# Patient Record
Sex: MALE | Race: BLACK OR AFRICAN AMERICAN | Employment: OTHER | URBAN - METROPOLITAN AREA
[De-identification: names, ages, dates, MRNs, and addresses within clinical notes are randomized per-mention and may not be internally consistent; named-entity substitution may affect disease eponyms.]

---

## 2017-03-07 ENCOUNTER — TELEPHONE (OUTPATIENT)
Dept: UROLOGY | Age: 72
End: 2017-03-07

## 2017-05-11 ENCOUNTER — OFFICE VISIT (OUTPATIENT)
Dept: UROLOGY | Age: 72
End: 2017-05-11

## 2017-05-11 VITALS
HEIGHT: 76 IN | WEIGHT: 252 LBS | OXYGEN SATURATION: 96 % | SYSTOLIC BLOOD PRESSURE: 157 MMHG | DIASTOLIC BLOOD PRESSURE: 79 MMHG | BODY MASS INDEX: 30.69 KG/M2 | HEART RATE: 68 BPM

## 2017-05-11 DIAGNOSIS — C61 PROSTATE CANCER (HCC): Primary | ICD-10-CM

## 2017-05-11 LAB
BILIRUB UR QL STRIP: NEGATIVE
GLUCOSE UR-MCNC: NEGATIVE MG/DL
KETONES P FAST UR STRIP-MCNC: NEGATIVE MG/DL
PH UR STRIP: 7.5 [PH] (ref 4.6–8)
PROT UR QL STRIP: NORMAL MG/DL
SP GR UR STRIP: 1.02 (ref 1–1.03)
UA UROBILINOGEN AMB POC: NORMAL (ref 0.2–1)
URINALYSIS CLARITY POC: CLEAR
URINALYSIS COLOR POC: YELLOW
URINE BLOOD POC: NEGATIVE
URINE LEUKOCYTES POC: NEGATIVE
URINE NITRITES POC: NEGATIVE

## 2017-05-11 NOTE — PROGRESS NOTES
Mr. Herman Case has a reminder for a \"due or due soon\" health maintenance. I have asked that he contact his primary care provider for follow-up on this health maintenance.

## 2017-05-11 NOTE — PROGRESS NOTES
Robertcali Dalton    Chief Complaint   Patient presents with    Prostate Cancer       History and Physical    The patient is a 40-year-old -American male with a diagnosis of high-grade prostate cancer metastatic to his bones, involving both axial and peripheral skeleton. His pretreatment PSA was listed as being greater than 100 and the laboratory did not perform dilution studies. The patient was begun on Casodex and August and within a short period of time a PSA was done and was down to 70. After a period of time of Casodex induction, the patient received a dose of Lupron 6 month Depo-Lupron. The patient did not return for his 6 month repeat injection. However, at that time a PSA had indicated a drop down to the level of 2.42. The patient comes in now doing quite well with stable health weight strength and appetite. He is having no bone issues. He has continued to take his Casodex    Past Medical History:   Diagnosis Date    Elevated PSA     Hypertension     Personal history of prostate cancer      There is no problem list on file for this patient. History reviewed. No pertinent surgical history. Current Outpatient Prescriptions   Medication Sig Dispense Refill    FOLIC ACID/MULTIVIT-MIN/LUTEIN (CENTRUM SILVER PO) Take by Mouth.  amLODIPine-benazepril (LOTREL) 5-10 mg per capsule   0    aspirin delayed-release 81 mg tablet Take  by mouth daily.  bicalutamide (CASODEX) 50 mg tablet Take 1 Tab by mouth daily. 30 Tab 11    leuprolide (LUPRON DEPOT, 6 MONTH,) 45 mg injection 45 mg by IntraMUSCular route once. No Known Allergies  Social History     Social History    Marital status: SINGLE     Spouse name: N/A    Number of children: N/A    Years of education: N/A     Occupational History    Not on file.      Social History Main Topics    Smoking status: Never Smoker    Smokeless tobacco: Never Used    Alcohol use No    Drug use: No    Sexual activity: No     Other Topics Concern    Not on file     Social History Narrative      Family History   Problem Relation Age of Onset    Hypertension Mother     Diabetes Mother     Stroke Sister            Visit Vitals    /79 (BP 1 Location: Right arm, BP Patient Position: Sitting)    Pulse 68    Ht 6' 4\" (1.93 m)    Wt 252 lb (114.3 kg)    SpO2 96%    BMI 30.67 kg/m2     Physical        Gen: WDWN adult NAD  Head  : normocephalic,  Normal ROM; eyes without normal pupils, EOMs, no masses;  conjunctiva normal  Neck: normal movement,  no evident mass,  No evident adenopathy, trachea midline,    Abd :bowel sounds normal, no masses, tenderness, organomegaly  Flanks     -    Extremities- no edema, arthritis, deformity, swelling  Psych- oriented, no evident anxiety, no cognitive impairment evident    Urine is negative  PSA is drawn                  Impression/ PLAN  High-grade stage T4 prostate cancer where I have attempted total androgen blockade but the patient has essentially been on Casodex monotherapy. Plan: This was another extended discussion covering the reasons behind the combination therapy. However, as compliance might be an issue, I am going to check a PSA today and assess how he is doing with prostate cancer suppression on Casodex monotherapy. If the PSA has rebounded, then we need to have him come back and restart Lupron and we can hopefully keep that going on an every six-month basis. Failing that, the patient would need to return to oncology. This visit exceeded 25 minutes and >50% was counselling  The patient understands the discussion and plan    PLEASE NOTE:      This document has been produced using voice recognition software.   Unrecognized errors in transcription may be present    Melissa Mercer MD

## 2017-05-11 NOTE — PATIENT INSTRUCTIONS
Prostate-Specific Antigen (PSA) Test: About This Test  What is it? A prostate-specific antigen (PSA) test measures the amount of PSA in your blood. PSA is released by a man's prostate gland into his blood. A high PSA level may mean that you have an enlargement, infection, or cancer of the prostate. Why is this test done? You may have this test to:  · Check for prostate cancer. · Watch prostate cancer and see if treatment is working. How can you prepare for the test?  Do not ejaculate during the 2 days before your PSA blood test, either during sex or masturbation. What happens before the test?  Tell your doctor if you have had a:  · Test to look at your bladder (cystoscopy) in the past several weeks. · Prostate biopsy in the past several weeks. · Prostate infection or urinary tract infection that has not gone away. · Tube (catheter) inserted into your bladder to drain urine recently. What happens during the test?  A health professional takes a sample of your blood. What happens after the test?  You can go back to your usual activities right away. When should you call for help? Watch closely for changes in your health, and be sure to contact your doctor if you have any questions about this test.  Follow-up care is a key part of your treatment and safety. Be sure to make and go to all appointments, and call your doctor if you are having problems. It's also a good idea to keep a list of the medicines you take. Ask your doctor when you can expect to have your test results. Where can you learn more? Go to http://natalia-oneyda.info/. Enter L898 in the search box to learn more about \"Prostate-Specific Antigen (PSA) Test: About This Test.\"  Current as of: July 26, 2016  Content Version: 11.2  © 0145-8164 [employer]. Care instructions adapted under license by [employer] (which disclaims liability or warranty for this information).  If you have questions about a medical condition or this instruction, always ask your healthcare professional. [place] [address] any warranty or liability for your use of this information.

## 2017-05-12 LAB — PSA SERPL-MCNC: 3.3 NG/ML (ref 0–4)

## 2017-05-13 NOTE — PROGRESS NOTES
Alcira Connors, please see that copy of recent psa is mailed to this patient.   I called and left him a message that we could stay with casodex by itself or add lupron

## 2018-02-20 ENCOUNTER — OFFICE VISIT (OUTPATIENT)
Dept: UROLOGY | Age: 73
End: 2018-02-20

## 2018-02-20 VITALS
SYSTOLIC BLOOD PRESSURE: 149 MMHG | DIASTOLIC BLOOD PRESSURE: 85 MMHG | HEIGHT: 76 IN | BODY MASS INDEX: 30.44 KG/M2 | HEART RATE: 66 BPM | WEIGHT: 250 LBS | OXYGEN SATURATION: 95 %

## 2018-02-20 DIAGNOSIS — M85.89 OTHER SPECIFIED DISORDERS OF BONE DENSITY AND STRUCTURE, MULTIPLE SITES: ICD-10-CM

## 2018-02-20 DIAGNOSIS — C61 PROSTATE CANCER (HCC): Primary | ICD-10-CM

## 2018-02-20 LAB
BILIRUB UR QL STRIP: NEGATIVE
GLUCOSE UR-MCNC: NEGATIVE MG/DL
KETONES P FAST UR STRIP-MCNC: NEGATIVE MG/DL
PH UR STRIP: 7.5 [PH] (ref 4.6–8)
PROT UR QL STRIP: NEGATIVE
SP GR UR STRIP: 1.01 (ref 1–1.03)
UA UROBILINOGEN AMB POC: NORMAL (ref 0.2–1)
URINALYSIS CLARITY POC: CLEAR
URINALYSIS COLOR POC: YELLOW
URINE BLOOD POC: NEGATIVE
URINE LEUKOCYTES POC: NEGATIVE
URINE NITRITES POC: NEGATIVE

## 2018-02-20 RX ORDER — BICALUTAMIDE 50 MG/1
50 TABLET, FILM COATED ORAL DAILY
Qty: 90 TAB | Refills: 3 | Status: SHIPPED | OUTPATIENT
Start: 2018-02-20 | End: 2018-04-26

## 2018-02-20 NOTE — PATIENT INSTRUCTIONS
Prostate-Specific Antigen (PSA) Test: About This Test  What is it? A prostate-specific antigen (PSA) test measures the amount of PSA in your blood. PSA is released by a man's prostate gland into his blood. A high PSA level may mean that you have an enlargement, infection, or cancer of the prostate. Why is this test done? You may have this test to:  · Check for prostate cancer. · Watch prostate cancer and see if treatment is working. How can you prepare for the test?  Do not ejaculate during the 2 days before your PSA blood test, either during sex or masturbation. What happens before the test?  Tell your doctor if you have had a:  · Test to look at your bladder (cystoscopy) in the past several weeks. · Prostate biopsy in the past several weeks. · Prostate infection or urinary tract infection that has not gone away. · Tube (catheter) inserted into your bladder to drain urine recently. What happens during the test?  A health professional takes a sample of your blood. What happens after the test?  You can go back to your usual activities right away. When should you call for help? Watch closely for changes in your health, and be sure to contact your doctor if you have any questions about this test.  Follow-up care is a key part of your treatment and safety. Be sure to make and go to all appointments, and call your doctor if you are having problems. It's also a good idea to keep a list of the medicines you take. Ask your doctor when you can expect to have your test results. Where can you learn more? Go to http://natalia-oneyda.info/. Enter N084 in the search box to learn more about \"Prostate-Specific Antigen (PSA) Test: About This Test.\"  Current as of: May 12, 2017  Content Version: 11.4  © 3591-7510 [employer]. Care instructions adapted under license by [employer] (which disclaims liability or warranty for this information).  If you have questions about a medical condition or this instruction, always ask your healthcare professional. [place] [address] any warranty or liability for your use of this information.

## 2018-02-20 NOTE — PROGRESS NOTES
RBV. Per Dr. Orion Treviño lab drawn in office today for PSA for prostate cancer. Mr. Becca Siegel has a reminder for a \"due or due soon\" health maintenance. I have asked that he contact his primary care provider for follow-up on this health maintenance.

## 2018-02-20 NOTE — MR AVS SNAPSHOT
[address] [facility] [place] [address] [address] 
[phone] Patient: Komal Julio MRN: [id number] QNW:[id number] Visit Information Date & Time Provider Department Dept. Phone Encounter #  
 2/20/2018  1:15 PM Harish Thomas, [facility] [phone] [id number] Follow-up Instructions Return in about 1 week (around 2/27/2018) for after bone density studies. Follow-up and Disposition History Your Appointments 3/20/2018  1:15 PM  
Office Visit with Harish Thomas MD  
[facility]) Appt Note: check up after bone density test  
 [address] [place] [address] [address]  
[phone] [address] [facility] [address] Upcoming Health Maintenance Date Due DTaP/Tdap/Td series (1 - Tdap) 9/13/1964 FOBT Q 1 YEAR AGE 50-75 9/13/1993 ZOSTER VACCINE AGE 60> 7/13/2003 GLAUCOMA SCREENING Q2Y 9/13/2008 Pneumococcal 65+ High/Highest Risk (1 of 2 - PCV13) 9/13/2008 MEDICARE YEARLY EXAM 9/13/2008 Influenza Age 5 to Adult 8/1/2017 Allergies as of 2/20/2018  Review Complete On: 2/20/2018 By: Andrey Conrad LPN No Known Allergies Current Immunizations  Never Reviewed No immunizations on file. Not reviewed this visit You Were Diagnosed With   
  
 Codes Comments Prostate cancer [facility])    -  Primary ICD-10-CM: F00 ICD-9-CM: 579 Other specified disorders of bone density and structure, multiple sites     ICD-10-CM: M85.89 ICD-9-CM: 733.99 Vitals BP Pulse Height(growth percentile) Weight(growth percentile) SpO2 BMI  
 149/85 (BP 1 Location: Left arm, BP Patient Position: Sitting) 66 6' 4\" (1.93 m) 250 lb (113.4 kg) 95% 30.43 kg/m2 Smoking Status Never Smoker Vitals History BMI and BSA Data Body Mass Index Body Surface Area 30.43 kg/m 2 2.47 m 2 Preferred Pharmacy Pharmacy Name Phone [address] [phone] Your Updated Medication List  
  
   
This list is accurate as of: 2/20/18  3:29 PM.  Always use your most recent med list. amLODIPine-benazepril 5-10 mg per capsule Commonly known as:  LOTREL  
  
 aspirin delayed-release 81 mg tablet Take  by mouth daily. * bicalutamide 50 mg tablet Commonly known as:  CASODEX Take 1 Tab by mouth daily. * bicalutamide 50 mg tablet Commonly known as:  CASODEX Take 1 Tab by mouth daily. CENTRUM SILVER PO Take by Mouth. LUPRON DEPOT (6 MONTH) 45 mg injection Generic drug:  leuprolide depot 45 mg by IntraMUSCular route once. * Notice: This list has 2 medication(s) that are the same as other medications prescribed for you. Read the directions carefully, and ask your doctor or other care provider to review them with you. Prescriptions Printed Refills  
 bicalutamide (CASODEX) 50 mg tablet 3 Sig: Take 1 Tab by mouth daily. Class: Print Route: Oral  
  
We Performed the Following AMB POC URINALYSIS DIP STICK AUTO W/O MICRO [24921 CPT(R)] COLLECTION VENOUS BLOOD,VENIPUNCTURE [id number] CPT(R)] PSA, DIAGNOSTIC (PROSTATE SPECIFIC AG) [id number] CPT(R)] Follow-up Instructions Return in about 1 week (around 2/27/2018) for after bone density studies. To-Do List   
 02/27/2018 Imaging:  DEXA BONE DENSITY STUDY AXIAL Patient Instructions Prostate-Specific Antigen (PSA) Test: About This Test 
What is it? A prostate-specific antigen (PSA) test measures the amount of PSA in your blood. PSA is released by a man's prostate gland into his blood. A high PSA level may mean that you have an enlargement, infection, or cancer of the prostate. Why is this test done? You may have this test to: · Check for prostate cancer. · Watch prostate cancer and see if treatment is working. How can you prepare for the test? 
Do not ejaculate during the 2 days before your PSA blood test, either during sex or masturbation. What happens before the test? 
Tell your doctor if you have had a: 
· Test to look at your bladder (cystoscopy) in the past several weeks. · Prostate biopsy in the past several weeks. · Prostate infection or urinary tract infection that has not gone away. · Tube (catheter) inserted into your bladder to drain urine recently. What happens during the test? 
A health professional takes a sample of your blood. What happens after the test? 
You can go back to your usual activities right away. When should you call for help? Watch closely for changes in your health, and be sure to contact your doctor if you have any questions about this test. 
Follow-up care is a key part of your treatment and safety. Be sure to make and go to all appointments, and call your doctor if you are having problems. It's also a good idea to keep a list of the medicines you take. Ask your doctor when you can expect to have your test results. Where can you learn more? Go to http://natalia-oneyda.info/. Enter B449 in the search box to learn more about \"Prostate-Specific Antigen (PSA) Test: About This Test.\" Current as of: May 12, 2017 Content Version: 11.4 © 3307-2640 [employer]. Care instructions adapted under license by [employer] (which disclaims liability or warranty for this information). If you have questions about a medical condition or this instruction, always ask your healthcare professional. [place] [address] any warranty or liability for your use of this information. Patient Instructions History Introducing [facility]!    
 Aylin Freeman introduces Chunnel.[employer] patient portal. Now you can access parts of your medical record, email your doctor's office, and request medication refills online. 1. In your internet browser, go to [email or web address] 2. Click on the First Time User? Click Here link in the Sign In box. You will see the New Member Sign Up page. 3. Enter your Plethora Access Code exactly as it appears below. You will not need to use this code after youve completed the sign-up process. If you do not sign up before the expiration date, you must request a new code. · Plethora Access Code: [email or web address] Expires: 5/21/2018  3:29 PM 
 
4. Enter the last four digits of your Social Security Number (xxxx) and Date of Birth (mm/dd/yyyy) as indicated and click Submit. You will be taken to the next sign-up page. 5. Create a Plethora ID. This will be your Plethora login ID and cannot be changed, so think of one that is secure and easy to remember. 6. Create a Plethora password. You can change your password at any time. 7. Enter your Password Reset Question and Answer. This can be used at a later time if you forget your password. 8. Enter your e-mail address. You will receive e-mail notification when new information is available in [address] 9. Click Sign Up. You can now view and download portions of your medical record. 10. Click the Download Summary menu link to download a portable copy of your medical information. If you have questions, please visit the Frequently Asked Questions section of the Plethora website. Remember, Plethora is NOT to be used for urgent needs. For medical emergencies, dial 911. Now available from your iPhone and Android! Please provide this summary of care documentation to your next provider. Your primary care clinician is listed as NONE. If you have any questions after today's visit, please call [phone].

## 2018-02-20 NOTE — PROGRESS NOTES
Patient is a 38-year-old -American male who has high-grade T4 prostate cancer with a PSA at presentation in excess of 100. The patient was temporarily on Lupron but refused further treatment and essentially has been on Casodex monotherapy. When I saw him in May his PSA was suppressed still at 3.3 and the patient comes now wanting to continue his Casodex. The patient has stable health weight strength and appetite. He denies any bone pain. His activity level is unchanged. Urine is negative  SA is drawn and I will call him with those results. In the meantime, I have again had an extended discussion with the patient regarding the need for bone density studies because the patient has been on hormone therapy on a rather irregular basis for several years. I have talked with the patient on several occasions about again becoming involved with oncology but the patient again wants to wait until today's PSA result is known      This visit exceeded 15 minutes and greater than 50% was counseling. The patient expresses understanding of the treatment plan and wishes to proceed    This dictation used voice recognition software and there may be mistakes.     Ernestina Courser MD

## 2018-02-21 LAB — PSA SERPL-MCNC: 163.1 NG/ML (ref 0–4)

## 2018-02-22 ENCOUNTER — CLINICAL SUPPORT (OUTPATIENT)
Dept: UROLOGY | Age: 73
End: 2018-02-22

## 2018-02-22 VITALS — HEIGHT: 76 IN

## 2018-02-22 DIAGNOSIS — C61 CANCER OF PROSTATE W/HIGH RECUR RISK (T3A OR GLEASON 8-10 OR PSA>20): Primary | ICD-10-CM

## 2018-02-22 DIAGNOSIS — R97.20 ELEVATED PSA: ICD-10-CM

## 2018-02-22 DIAGNOSIS — C61 PROSTATE CANCER (HCC): Primary | ICD-10-CM

## 2018-02-22 NOTE — PROGRESS NOTES
After the telephone conversation this morning during which I informed the patient that his PSA was up at 163, the patient comes now for a Lupron shot. The patient reassures me that he has continued to take his Casodex on a daily basis. The patient is asking whether or not dietary changes could have influenced the PSA and I have advised him now. Receives a 45 mg 6 month Lupron injection in the right upper outer gluteal quadrant he tolerated this well and we are agreed that the patient will return in 2 months for a PSA recheck. This visit exceeded 10 minutes and greater than 50% was counseling. The patient expresses understanding of the treatment plan and wishes to proceed    This dictation used voice recognition software and there may be mistakes.     Alannah Farris MD

## 2018-02-22 NOTE — PROGRESS NOTES
A voicemail message for the patient indicating that the PSA has gone up to 163 and this represents a failure of the Casodex to suppress the cancer and that at the very least we need to restart him on the Lupron and get a bone scan in addition to the bone density study that is already been scheduled.   I have asked the patient to please call the office back and that I will go ahead and get the bone scan scheduled

## 2018-02-22 NOTE — PATIENT INSTRUCTIONS
Leuprolide (By injection)   Leuprolide ([id number])  Treats endometriosis, uterine fibroids, and premature puberty. Also treats symptoms of prostate cancer. Brand Name(s): Eligard, Lupaneta Pack, Lupron Depot, Lupron Depot-Ped   There may be other brand names for this medicine. When This Medicine Should Not Be Used: This medicine is not right for everyone. You should not receive it if you had an allergic reaction to leuprolide or similar medicines, or if you are pregnant or breastfeeding. How to Use This Medicine:   Injectable  · Your doctor will prescribe your exact dose and schedule. This medicine is given as a shot into a muscle or under the skin. Leuprolide injection is given on different schedules for different conditions. It might be given every day, once a month, or every few months. · A nurse or other health provider will give you this medicine. · You may be taught how to give your medicine at home. Make sure you understand all instructions before giving yourself an injection. Do not use more medicine or use it more often than your doctor tells you to. · You will be shown the body areas where this shot can be given. Use a different body area each time you give yourself a shot. Keep track of where you give each shot to make sure you rotate body areas. · Use a new needle and syringe each time you inject your medicine. · This medicine should come with a Medication Guide. Ask your pharmacist for a copy if you do not have one. · Read and follow the patient instructions that come with this medicine. Talk to your doctor or pharmacist if you have any questions. · Missed dose: This medicine needs to be given on a fixed schedule. If you miss a dose, call your doctor, home health caregiver, or treatment clinic for instructions. · If you store this medicine at home, keep it at room temperature, away from heat, moisture, and direct light.   Drugs and Foods to Avoid:   Ask your doctor or pharmacist before using any other medicine, including over-the-counter medicines, vitamins, and herbal products. · Some medicines can affect how leuprolide works. Tell your doctor if you are using any of the following:  ¨ Medicine to treat depression (including bupropion)  ¨ Medicine to treat heart rhythm problems  ¨ Medicine to treat seizures  ¨ Steroid medicine (including dexamethasone, hydrocortisone, methylprednisolone, prednisolone, prednisone)  Warnings While Using This Medicine:   · It is not safe to take this medicine during pregnancy. It could harm an unborn baby. Tell your doctor right away if you become pregnant. Women who are using this medicine should use birth control that does not contain hormones. · Tell your doctor if you have kidney disease, heart failure, diabetes, heart or blood vessel disease, heart rhythm problems (including long QT syndrome), problems with your nervous system, or a history of brain tumor, depression, mental illness, seizures, or stroke. · Women: Your menstrual periods should stop, but you might have light bleeding or spotting. If you continue to have heavy bleeding or regular periods, call your doctor. · This medicine may cause the following problems:  ¨ Changes in mood or behavior  ¨ Increased risk for seizures  ¨ Heart rhythm changes  ¨ Weaker bones, which may lead to osteoporosis  ¨ Problems with the urinary tract or spinal cord (in men)  ¨ Changes in blood sugar levels (in men)  ¨ Higher risk of heart attack or stroke (in men)  · Your symptoms might get worse when you first start using this medicine, but they should get better as the medicine starts to work. If your condition does not begin to improve after 2 weeks, check with your doctor. · Tell any doctor or dentist who treats you that you are using this medicine. This medicine may affect certain medical test results. · Your doctor will check your progress and the effects of this medicine at regular visits.  Keep all appointments. · Keep all medicine out of the reach of children. Never share your medicine with anyone. Possible Side Effects While Using This Medicine:   Call your doctor right away if you notice any of these side effects:  · Allergic reaction: Itching or hives, swelling in your face or hands, swelling or tingling in your mouth or throat, chest tightness, trouble breathing  · Change in how much or how often you urinate  · Depression, mood or behavior changes  · Fast, pounding, or uneven heartbeat  · Heavy vaginal bleeding  · Seizures  · Unusual or severe bone or back pain  If you notice these less serious side effects, talk with your doctor:   · Headache  · Hot flashes and sweating, warmth or redness in your face, neck, arms, or upper chest  · Loss of interest in sex, sexual problems  · Pain, itching, burning, bruises, or swelling where the shot was given  If you notice other side effects that you think are caused by this medicine, tell your doctor. Call your doctor for medical advice about side effects. You may report side effects to FDA at 0-997-FDA-9081  © 2017 [facility] Information is for End User's use only and may not be sold, redistributed or otherwise used for commercial purposes. The above information is an  only. It is not intended as medical advice for individual conditions or treatments. Talk to your doctor, nurse or pharmacist before following any medical regimen to see if it is safe and effective for you.

## 2018-02-22 NOTE — PROGRESS NOTES
MrKeenan Keller has a reminder for a \"due or due soon\" health maintenance. I have asked that he contact his primary care provider for follow-up on this health maintenance.

## 2018-04-26 ENCOUNTER — OFFICE VISIT (OUTPATIENT)
Dept: UROLOGY | Age: 73
End: 2018-04-26

## 2018-04-26 VITALS
SYSTOLIC BLOOD PRESSURE: 132 MMHG | BODY MASS INDEX: 31.29 KG/M2 | OXYGEN SATURATION: 95 % | WEIGHT: 257 LBS | DIASTOLIC BLOOD PRESSURE: 76 MMHG | HEIGHT: 76 IN | HEART RATE: 70 BPM

## 2018-04-26 DIAGNOSIS — C61 CANCER OF PROSTATE (HCC): ICD-10-CM

## 2018-04-26 DIAGNOSIS — R97.20 ELEVATED PSA: Primary | ICD-10-CM

## 2018-04-26 LAB
BILIRUB UR QL STRIP: NEGATIVE
GLUCOSE UR-MCNC: NEGATIVE MG/DL
KETONES P FAST UR STRIP-MCNC: NEGATIVE MG/DL
PH UR STRIP: 7 [PH] (ref 4.6–8)
PROT UR QL STRIP: NORMAL
SP GR UR STRIP: 1.01 (ref 1–1.03)
UA UROBILINOGEN AMB POC: NORMAL (ref 0.2–1)
URINALYSIS CLARITY POC: CLEAR
URINALYSIS COLOR POC: YELLOW
URINE BLOOD POC: NORMAL
URINE LEUKOCYTES POC: NEGATIVE
URINE NITRITES POC: NEGATIVE

## 2018-04-26 RX ORDER — BICALUTAMIDE 50 MG/1
50 TABLET, FILM COATED ORAL DAILY
Qty: 90 TAB | Refills: 4 | Status: SHIPPED | OUTPATIENT
Start: 2018-04-26

## 2018-04-26 NOTE — PATIENT INSTRUCTIONS
Prostate-Specific Antigen (PSA) Test: About This Test  What is it? A prostate-specific antigen (PSA) test measures the amount of PSA in your blood. PSA is released by a man's prostate gland into his blood. A high PSA level may mean that you have an enlargement, infection, or cancer of the prostate. Why is this test done? You may have this test to:  · Check for prostate cancer. · Watch prostate cancer and see if treatment is working. How can you prepare for the test?  Do not ejaculate during the 2 days before your PSA blood test, either during sex or masturbation. What happens before the test?  Tell your doctor if you have had a:  · Test to look at your bladder (cystoscopy) in the past several weeks. · Prostate biopsy in the past several weeks. · Prostate infection or urinary tract infection that has not gone away. · Tube (catheter) inserted into your bladder to drain urine recently. What happens during the test?  A health professional takes a sample of your blood. What happens after the test?  You can go back to your usual activities right away. When should you call for help? Watch closely for changes in your health, and be sure to contact your doctor if you have any questions about this test.  Follow-up care is a key part of your treatment and safety. Be sure to make and go to all appointments, and call your doctor if you are having problems. It's also a good idea to keep a list of the medicines you take. Ask your doctor when you can expect to have your test results. Where can you learn more? Go to http://natalia-oneyda.info/. Enter M794 in the search box to learn more about \"Prostate-Specific Antigen (PSA) Test: About This Test.\"  Current as of: May 12, 2017  Content Version: 11.4  © 4512-2745 [employer]. Care instructions adapted under license by [employer] (which disclaims liability or warranty for this information).  If you have questions about a medical condition or this instruction, always ask your healthcare professional. [place] [address] any warranty or liability for your use of this information.

## 2018-04-26 NOTE — PROGRESS NOTES
Marilynn Jara    Chief Complaint   Patient presents with   Jl Luo Elevated PSA       History and Physical    The patient is a 75-year-old -American male who is known to this office and has a complex history of prostate cancer. I will copy and paste a summary that I made at his first visit with me nearly 2 years ago    The patient was diagnosed in 2000 with prostate cancer by elevated PSA and biopsy. In January 2001 the patient underwent a nerve sparing radical retropubic prostatectomy. By December 2001, the patient must of had a PSA that was rising because he underwent radiation therapy. It is my understanding that he did well from then until about 2009 when his PSA was seen to be going up. The patient was placed on Casodex without LHRH analog and it would appear that his PSA was essentially stable at 0.94 about a year. The patient continued to have rising PSA and at some point over a year ago had CT scan and bone scan and it was suggestive that the patient had extensive bony involvement with his prostate cancer. From what I can conclude, the patient was advised to have a trial of hormone withdrawal to see if the PSA would come down. It did not. It is again confusing about whether or not the patient received any advice about second line hormonal therapy or chemotherapy. Since the patient came to see me, he seemed to be content with taking Casodex. However, as of the last PSA check in February of this year, the PSA had gone up to 163 while the patient was on Casodex. He came in and received a Lupron six-month Depakote injection and that was 2 months ago. The patient comes now for a PSA check. At the last visit I had ordered a bone scan and a bone density study. The patient did not keep his appointment for that and the chart indicates that the schedulers try to contact him on multiple occasions. He reports stable health weight strength and energy. He has not had any bone pain.   Past Medical History:   Diagnosis Date    Elevated PSA     Hypertension     Personal history of prostate cancer      There is no problem list on file for this patient. History reviewed. No pertinent surgical history. Current Outpatient Prescriptions   Medication Sig Dispense Refill    leuprolide (LUPRON DEPOT, 6 MONTH,) 45 mg injection 45 mg by IntraMUSCular route once.  FOLIC ACID/MULTIVIT-MIN/LUTEIN (CENTRUM SILVER PO) Take by Mouth.  amLODIPine-benazepril (LOTREL) 5-10 mg per capsule   0    aspirin delayed-release 81 mg tablet Take  by mouth daily.  bicalutamide (CASODEX) 50 mg tablet Take 1 Tab by mouth daily. 30 Tab 11     No Known Allergies  Social History     Social History    Marital status: SINGLE     Spouse name: N/A    Number of children: N/A    Years of education: N/A     Occupational History    Not on file. Social History Main Topics    Smoking status: Never Smoker    Smokeless tobacco: Never Used    Alcohol use No    Drug use: No    Sexual activity: No     Other Topics Concern    Not on file     Social History Narrative      Family History   Problem Relation Age of Onset    Hypertension Mother     Diabetes Mother     Stroke Sister              Visit Vitals    Ht 6' 4\" (1.93 m)    Wt 257 lb (116.6 kg)    BMI 31.28 kg/m2     Physical        Gen: WDWN adult NAD  Head  : normocephalic,  Normal ROM; eyes without normal pupils, EOMs, no masses;  conjunctiva normal  Neck: normal movement,  no evident mass,  No evident adenopathy, trachea midline,    Flanks     -    Extremities- no edema, arthritis, deformity, swelling  Psych- oriented, no evident anxiety, no cognitive impairment evident urine is trace positive for blood  PSA is drawn                  Impression/ PLAN  History of metastatic prostate cancer with variable treatment and with a rising PSA which led to a Lupron shot 2 months ago.   Currently on Casodex      Plan:  I have again discussed extensively with the patient the reason for wanting a bone density study and a bone scan. The patient responds that he does not want to do multiple things at the same time and wants simply to wait for the PSA result to come back. I will call him            This visit exceeded 25 minutes and >50% was counselling  The patient understands the discussion and plan    PLEASE NOTE:      This document has been produced using voice recognition software.   Unrecognized errors in transcription may be present    Fan Beebe MD

## 2018-04-26 NOTE — MR AVS SNAPSHOT
[address] [facility] [address] 
[phone] Patient: Bhumi Perez MRN: [id number] DOA:[birth year] Visit Information Date & Time Provider Department Dept. Phone Encounter #  
 4/26/2018  1:15 PM Judit Flanagan, [facility] [place] [facility] [phone] Follow-up Instructions Return in about 4 months (around 8/26/2018) for lupron. Follow-up and Disposition History Your Appointments 8/9/2018  1:15 PM  
IMMUNIZATIONS/INJECTIONS with Judit Flanagan MD  
[facility]) Appt Note: [address] [facility] [address]  
[phone] [address] [place] [address] Upcoming Health Maintenance Date Due DTaP/Tdap/Td series (1 - Tdap) 9/13/1964 FOBT Q 1 YEAR AGE 50-75 9/13/1993 ZOSTER VACCINE AGE 60> 7/13/2003 GLAUCOMA SCREENING Q2Y 9/13/2008 Pneumococcal 65+ High/Highest Risk (1 of 2 - PCV13) 9/13/2008 Influenza Age 5 to Adult 8/1/2017 MEDICARE YEARLY EXAM 3/14/2018 Allergies as of 4/26/2018  Review Complete On: 4/26/2018 By: Judit Flanagan MD  
 No Known Allergies Current Immunizations  Never Reviewed No immunizations on file. Not reviewed this visit You Were Diagnosed With   
  
 Codes Comments Elevated PSA    -  Primary ICD-10-CM: R97.20 ICD-9-CM: 790.93 Cancer of prostate [facility])     ICD-10-CM: O55 ICD-9-CM: 132 Vitals BP Pulse Height(growth percentile) Weight(growth percentile) SpO2 BMI  
 132/76 (BP 1 Location: Left arm, BP Patient Position: Sitting) 70 6' 4\" (1.93 m) 257 lb (116.6 kg) 95% 31.28 kg/m2 Smoking Status Never Smoker Vitals History BMI and BSA Data Body Mass Index Body Surface Area  
 31.28 kg/m 2 2.5 m 2 Preferred Pharmacy Pharmacy Name Phone [facility] [phone] Your Updated Medication List  
  
   
This list is accurate as of 4/26/18  2:30 PM.  Always use your most recent med list. amLODIPine-benazepril 5-10 mg per capsule Commonly known as:  LOTREL  
  
 aspirin delayed-release 81 mg tablet Take  by mouth daily. * bicalutamide 50 mg tablet Commonly known as:  CASODEX Take 1 Tab by mouth daily. * bicalutamide 50 mg tablet Commonly known as:  CASODEX Take 1 Tab by mouth daily. CENTRUM SILVER PO Take by Mouth. LUPRON DEPOT (6 MONTH) 45 mg injection Generic drug:  leuprolide depot 45 mg by IntraMUSCular route once. * Notice: This list has 2 medication(s) that are the same as other medications prescribed for you. Read the directions carefully, and ask your doctor or other care provider to review them with you. Prescriptions Printed Refills  
 bicalutamide (CASODEX) 50 mg tablet 4 Sig: Take 1 Tab by mouth daily. Class: Print Route: Oral  
  
We Performed the Following AMB POC URINALYSIS DIP STICK AUTO W/O MICRO [13904 CPT(R)] COLLECTION VENOUS BLOOD,VENIPUNCTURE [id number] CPT(R)] Follow-up Instructions Return in about 4 months (around 8/26/2018) for lupron. Patient Instructions Prostate-Specific Antigen (PSA) Test: About This Test 
What is it? A prostate-specific antigen (PSA) test measures the amount of PSA in your blood. PSA is released by a man's prostate gland into his blood. A high PSA level may mean that you have an enlargement, infection, or cancer of the prostate. Why is this test done? You may have this test to: · Check for prostate cancer. · Watch prostate cancer and see if treatment is working. How can you prepare for the test? 
Do not ejaculate during the 2 days before your PSA blood test, either during sex or masturbation.  
What happens before the test? 
 Tell your doctor if you have had a: 
· Test to look at your bladder (cystoscopy) in the past several weeks. · Prostate biopsy in the past several weeks. · Prostate infection or urinary tract infection that has not gone away. · Tube (catheter) inserted into your bladder to drain urine recently. What happens during the test? 
A health professional takes a sample of your blood. What happens after the test? 
You can go back to your usual activities right away. When should you call for help? Watch closely for changes in your health, and be sure to contact your doctor if you have any questions about this test. 
Follow-up care is a key part of your treatment and safety. Be sure to make and go to all appointments, and call your doctor if you are having problems. It's also a good idea to keep a list of the medicines you take. Ask your doctor when you can expect to have your test results. Where can you learn more? Go to http://natalia-oneyda.info/. Enter Z350 in the search box to learn more about \"Prostate-Specific Antigen (PSA) Test: About This Test.\" Current as of: May 12, 2017 Content Version: 11.4 © 7349-8380 [employer]. Care instructions adapted under license by [employer] (which disclaims liability or warranty for this information). If you have questions about a medical condition or this instruction, always ask your healthcare professional. [facility] [address] any warranty or liability for your use of this information. Patient Instructions History Introducing [facility]! Raymond Emery introduces [email or web address] patient portal. Now you can access parts of your medical record, email your doctor's office, and request medication refills online. 1. In your internet browser, go to [email or web address] 2. Click on the First Time User? Click Here link in the Sign In box. You will see the New Member Sign Up page. 3. Enter your [facility] Access Code exactly as it appears below. You will not need to use this code after youve completed the sign-up process. If you do not sign up before the expiration date, you must request a new code. · [facility] Access Code: [email or web address] Expires: 5/21/2018  4:29 PM 
 
4. Enter the last four digits of your Social Security Number (xxxx) and Date of Birth (mm/dd/yyyy) as indicated and click Submit. You will be taken to the next sign-up page. 5. Create a [facility] ID. This will be your [facility] login ID and cannot be changed, so think of one that is secure and easy to remember. 6. Create a [facility] password. You can change your password at any time. 7. Enter your Password Reset Question and Answer. This can be used at a later time if you forget your password. 8. Enter your e-mail address. You will receive e-mail notification when new information is available in [address]. 9. Click Sign Up. You can now view and download portions of your medical record. 10. Click the Download Summary menu link to download a portable copy of your medical information. If you have questions, please visit the Frequently Asked Questions section of the [facility] website. Remember, [facility] is NOT to be used for urgent needs. For medical emergencies, dial 911. Now available from your iPhone and Android! Please provide this summary of care documentation to your next provider. Your primary care clinician is listed as NONE. If you have any questions after today's visit, please call [phone].

## 2018-04-26 NOTE — PROGRESS NOTES
RBV. Per Dr. Philippe [facility] lab drawn in office today for PSA for elevated PSA. Mr. Marybel Moran has a reminder for a \"due or due soon\" health maintenance. I have asked that he contact his primary care provider for follow-up on this health maintenance.

## 2018-04-27 LAB — PSA SERPL-MCNC: 155.3 NG/ML (ref 0–4)

## 2018-05-02 NOTE — PROGRESS NOTES
PSA has come down slightly with the addition of the Lupron. Keep his regular appointment with Dr. Nidia Funez. Since he seems to be feeling very well and not having severe pain, I would probably lean toward recommending him to see an oncologist since he has hormone resistant prostate cancer. I think the other alternative is to simply keep him on the Casodex which he seems content in taking.

## 2018-05-14 ENCOUNTER — TELEPHONE (OUTPATIENT)
Dept: UROLOGY | Age: 73
End: 2018-05-14

## 2018-05-14 NOTE — TELEPHONE ENCOUNTER
Called and left a voicemail message indicating that the PSA had not really declined to any significance and we must consider the fact that his cancer is becoming hormone resistant. I have encouraged him to get the necessary scans that we had ordered and he did not keep the appointments for.   Also advised that we should consider oncology consultation but he should call us back

## 2018-05-15 ENCOUNTER — TELEPHONE (OUTPATIENT)
Dept: UROLOGY | Age: 73
End: 2018-05-15

## 2018-05-15 NOTE — TELEPHONE ENCOUNTER
The patient returned my voicemail message. What then ensued was a 13 minute discussion where the patient was essentially not acknowledging my concerns that his PSA had not altered substantially with the Lupron dose and that he was developing essentially castration resistant prostate cancer and that I advised involving an oncologist.  The patient insisted on linking his PSA levels with his blood pressure and then focusing on the fifth difference in time between when blood tests were accomplished and he received phone calls. He also seemed rather perseverant about the August appointment. I tried to advise him that the August appointment was made prior to our realizing that his PSA was not being influenced by the Lupron. The patient still has not accomplished the imaging studies that I had requested and this also became somewhat confusing as the patient was trying to discuss the implications of the timing of the imaging studies relative to waiting to recheck a PSA.   I had again, on at least 3 separate occasions during this phone call, advised him that my advice was not to wait and get another PSA determination because I did not feel that there would be any difference and would simply confirm what we already know and put the patient at risk because I have no idea at all what status of his bones are because he has refused to get the bone scan done  I had to leave it with him that my advice was as noted above and that he would have to make the decision on his own

## 2018-05-23 ENCOUNTER — DOCUMENTATION ONLY (OUTPATIENT)
Dept: UROLOGY | Age: 73
End: 2018-05-23

## 2018-05-29 ENCOUNTER — TELEPHONE (OUTPATIENT)
Dept: UROLOGY | Age: 73
End: 2018-05-29

## 2018-08-09 ENCOUNTER — TELEPHONE (OUTPATIENT)
Dept: UROLOGY | Age: 73
End: 2018-08-09

## 2018-08-09 NOTE — TELEPHONE ENCOUNTER
Called patient to let him know he had an appointment with Dr. Emeka Burk today and missed it for his next Lupron injection but his mail box was full. Sending letter to let him know to call the office to rescheduled.

## 2018-08-10 ENCOUNTER — DOCUMENTATION ONLY (OUTPATIENT)
Dept: UROLOGY | Age: 73
End: 2018-08-10

## 2018-08-10 NOTE — PROGRESS NOTES
Dr. Mandy Ferrera referred the patient  to Dr. Jose Pablo for consultation. Appointment was scheduled for 5/31/18 and the patient called and canceled it with No reschedule.

## 2023-02-17 NOTE — MR AVS SNAPSHOT
[address] [facility] [address] [address] 
[phone] Patient: Jose Verde MRN: [id number] HHI:[id number] Visit Information Date & Time Provider Department Dept. Phone Encounter #  
 2/22/2018 11:00 AM [facility] [place] [facility] [id number] Follow-up Instructions Return in about 2 months (around 4/22/2018) for psa. Your Appointments 4/26/2018  1:15 PM  
Office Visit with Callie Edgar MD  
[facility]) Appt Note: check up/ bone density test  
 [address] [facility] [address] [address]  
[phone] [address] [id number]  
  
    
 8/9/2018  1:15 PM  
IMMUNIZATIONS/INJECTIONS with Callie Edgar MD  
[facility]) Appt Note: [address] [facility] [address] [address]  
[phone] [address] [facility] [address] [facility] [address] Upcoming Health Maintenance Date Due DTaP/Tdap/Td series (1 - Tdap) 9/13/1964 FOBT Q 1 YEAR AGE 50-75 9/13/1993 ZOSTER VACCINE AGE 60> 7/13/2003 GLAUCOMA SCREENING Q2Y 9/13/2008 Pneumococcal 65+ High/Highest Risk (1 of 2 - PCV13) 9/13/2008 MEDICARE YEARLY EXAM 9/13/2008 Influenza Age 5 to Adult 8/1/2017 Allergies as of 2/22/2018  Review Complete On: 2/22/2018 By: Callie Edgar MD  
 No Known Allergies Current Immunizations  Never Reviewed No immunizations on file. Not reviewed this visit You Were Diagnosed With   
  
 Codes Comments Prostate cancer [facility])    -  Primary ICD-10-CM: G65 ICD-9-CM: 616 Elevated PSA     ICD-10-CM: R97.20 ICD-9-CM: 790.93 Vitals Height(growth percentile) Smoking Status 6' 4\" (1.93 m) Never Smoker Preferred Pharmacy Pharmacy Name Phone [facility] [address] [phone] Your Updated Medication List  
  
   
This list is accurate as of 2/22/18  1:05 PM.  Always use your most recent med list. amLODIPine-benazepril 5-10 mg per capsule Commonly known as:  LOTREL  
  
 aspirin delayed-release 81 mg tablet Take  by mouth daily. * bicalutamide 50 mg tablet Commonly known as:  CASODEX Take 1 Tab by mouth daily. * bicalutamide 50 mg tablet Commonly known as:  CASODEX Take 1 Tab by mouth daily. CENTRUM SILVER PO Take by Mouth. * LUPRON DEPOT (6 MONTH) 45 mg injection Generic drug:  leuprolide depot 45 mg by IntraMUSCular route once. * leuprolide depot 45 mg injection Commonly known as:  LUPRON 6 MONTH  
1 Each by IntraMUSCular route once for 1 dose. * Notice: This list has 4 medication(s) that are the same as other medications prescribed for you. Read the directions carefully, and ask your doctor or other care provider to review them with you. We Performed the Following LEUPROLIDE ACETATE SUSPNSION [ [facility]] [place] CHEMOTHER HORMON ANTINEOPL SUB-Q/IM [id number] CPT(R)] Follow-up Instructions Return in about 2 months (around 4/22/2018) for psa. Patient Instructions Leuprolide (By injection) Leuprolide ([id number]-oryp-lmuw) Treats endometriosis, uterine fibroids, and premature puberty. Also treats symptoms of prostate cancer. Brand Name(s): Eligard, Lupaneta Pack, Lupron Depot, Lupron Depot-Ped There may be other brand names for this medicine. When This Medicine Should Not Be Used: This medicine is not right for everyone. You should not receive it if you had an allergic reaction to leuprolide or similar medicines, or if you are pregnant or breastfeeding. How to Use This Medicine:  
Injectable · Your doctor will prescribe your exact dose and schedule.  This medicine yes is given as a shot into a muscle or under the skin. Leuprolide injection is given on different schedules for different conditions. It might be given every day, once a month, or every few months. · A nurse or other health provider will give you this medicine. · You may be taught how to give your medicine at home. Make sure you understand all instructions before giving yourself an injection. Do not use more medicine or use it more often than your doctor tells you to. · You will be shown the body areas where this shot can be given. Use a different body area each time you give yourself a shot. Keep track of where you give each shot to make sure you rotate body areas. · Use a new needle and syringe each time you inject your medicine. · This medicine should come with a Medication Guide. Ask your pharmacist for a copy if you do not have one. · Read and follow the patient instructions that come with this medicine. Talk to your doctor or pharmacist if you have any questions. · Missed dose: This medicine needs to be given on a fixed schedule. If you miss a dose, call your doctor, home health caregiver, or treatment clinic for instructions. · If you store this medicine at home, keep it at room temperature, away from heat, moisture, and direct light. Drugs and Foods to Avoid: Ask your doctor or pharmacist before using any other medicine, including over-the-counter medicines, vitamins, and herbal products. · Some medicines can affect how leuprolide works. Tell your doctor if you are using any of the following: ¨ Medicine to treat depression (including bupropion) ¨ Medicine to treat heart rhythm problems ¨ Medicine to treat seizures ¨ Steroid medicine (including dexamethasone, hydrocortisone, methylprednisolone, prednisolone, prednisone) Warnings While Using This Medicine: · It is not safe to take this medicine during pregnancy.  It could harm an unborn baby. Tell your doctor right away if you become pregnant. Women who are using this medicine should use birth control that does not contain hormones. · Tell your doctor if you have kidney disease, heart failure, diabetes, heart or blood vessel disease, heart rhythm problems (including long QT syndrome), problems with your nervous system, or a history of brain tumor, depression, mental illness, seizures, or stroke. · Women: Your menstrual periods should stop, but you might have light bleeding or spotting. If you continue to have heavy bleeding or regular periods, call your doctor. · This medicine may cause the following problems: 
¨ Changes in mood or behavior ¨ Increased risk for seizures ¨ Heart rhythm changes ¨ Weaker bones, which may lead to osteoporosis ¨ Problems with the urinary tract or spinal cord (in men) ¨ Changes in blood sugar levels (in men) ¨ Higher risk of heart attack or stroke (in men) · Your symptoms might get worse when you first start using this medicine, but they should get better as the medicine starts to work. If your condition does not begin to improve after 2 weeks, check with your doctor. · Tell any doctor or dentist who treats you that you are using this medicine. This medicine may affect certain medical test results. · Your doctor will check your progress and the effects of this medicine at regular visits. Keep all appointments. · Keep all medicine out of the reach of children. Never share your medicine with anyone. Possible Side Effects While Using This Medicine:  
Call your doctor right away if you notice any of these side effects: · Allergic reaction: Itching or hives, swelling in your face or hands, swelling or tingling in your mouth or throat, chest tightness, trouble breathing · Change in how much or how often you urinate · Depression, mood or behavior changes · Fast, pounding, or uneven heartbeat · Heavy vaginal bleeding · Seizures · Unusual or severe bone or back pain If you notice these less serious side effects, talk with your doctor:  
· Headache · Hot flashes and sweating, warmth or redness in your face, neck, arms, or upper chest 
· Loss of interest in sex, sexual problems · Pain, itching, burning, bruises, or swelling where the shot was given If you notice other side effects that you think are caused by this medicine, tell your doctor. Call your doctor for medical advice about side effects. You may report side effects to FDA at 8-732-WDO-7738 © 2017 [address] Information is for End User's use only and may not be sold, redistributed or otherwise used for commercial purposes. The above information is an  only. It is not intended as medical advice for individual conditions or treatments. Talk to your doctor, nurse or pharmacist before following any medical regimen to see if it is safe and effective for you. Introducing [facility]! Matt Rose introduces [employer] patient portal. Now you can access parts of your medical record, email your doctor's office, and request medication refills online. 1. In your internet browser, go to [email or web address] 2. Click on the First Time User? Click Here link in the Sign In box. You will see the New Member Sign Up page. 3. Enter your [employer] Access Code exactly as it appears below. You will not need to use this code after youve completed the sign-up process. If you do not sign up before the expiration date, you must request a new code. · [employer] Access Code: [email or web address] Expires: 5/21/2018  3:29 PM 
 
4. Enter the last four digits of your Social Security Number (xxxx) and Date of Birth (mm/dd/yyyy) as indicated and click Submit. You will be taken to the next sign-up page. 5. Create a [employer] ID. This will be your [employer] login ID and cannot be changed, so think of one that is secure and easy to remember. 6. Create a [employer] password. You can change your password at any time. 7. Enter your Password Reset Question and Answer. This can be used at a later time if you forget your password. 8. Enter your e-mail address. You will receive e-mail notification when new information is available in [address] 9. Click Sign Up. You can now view and download portions of your medical record. 10. Click the Download Summary menu link to download a portable copy of your medical information. If you have questions, please visit the Frequently Asked Questions section of the [employer] website. Remember, [employer] is NOT to be used for urgent needs. For medical emergencies, dial 911. Now available from your iPhone and Android! Please provide this summary of care documentation to your next provider. Your primary care clinician is listed as NONE. If you have any questions after today's visit, please call [phone].